# Patient Record
Sex: FEMALE | Race: BLACK OR AFRICAN AMERICAN | NOT HISPANIC OR LATINO | URBAN - METROPOLITAN AREA
[De-identification: names, ages, dates, MRNs, and addresses within clinical notes are randomized per-mention and may not be internally consistent; named-entity substitution may affect disease eponyms.]

---
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
## 2025-07-25 NOTE — ED PROVIDER NOTE - MUSCULOSKELETAL NEGATIVE STATEMENT, MLM
Benjamin used. (#[id number])  
Bed: 14  Expected date:   Expected time:   Means of arrival:   Comments:  3rd  
MD Mariano confirmed blood gas and lactic acid labs no longer needed.  
Pt alert and stable at this time.   
no musculoskeletal pain